# Patient Record
(demographics unavailable — no encounter records)

---

## 2025-03-14 NOTE — HISTORY OF PRESENT ILLNESS
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  During recent hospital admission, patient had CVA workup with carotid duplex which showed no evidence of hemodynamically significant lesions in either of the carotid arteries. Patient reports bilateral lower extremity joint pain, right worse than left.  Denies rest pain or claudication symptoms.  Denies tissue loss.  No history of deep vein thrombosis or pulmonary embolism.

## 2025-03-14 NOTE — REASON FOR VISIT
[Consultation] : a consultation visit [[facility] ] : provided by [employer] [facility]   [Time Spent: ____ minutes] : Total time spent using  services: [unfilled] minutes. The patient's primary language is not English thus required  services. [Interpreters_IDNumber] : [id number] [Interpreters_FullName] : [place] [TWNoteComboBox1] : Wallisian

## 2025-03-14 NOTE — ASSESSMENT
[FreeTextEntry1] : 74-year-old female with bilateral lower extremity pain and tenderness.  Duplex today shows no evidence of deep vein thrombosis or superficial phlebitis in either of the lower extremities.  Patient with bilateral lower extremity weakness.  No rest pain or tissue loss. Pedal pulses are intact bilaterally.  Patient to return to office for baseline PVR with exercise.  Patient with history of CVA x 2.  Recent carotid duplex negative for carotid stenosis.  Patient to continue aspirin 81 mg daily.  Follow-up in August for carotid duplex.

## 2025-03-14 NOTE — PHYSICAL EXAM
[Normal Breath Sounds] : Normal breath sounds [Normal Rate and Rhythm] : normal rate and rhythm [2+] : left 2+ [Ankle Swelling (On Exam)] : present [Ankle Swelling Bilaterally] : bilaterally  [Varicose Veins Of Lower Extremities] : bilaterally [Ankle Swelling On The Right] : mild [No Rash or Lesion] : No rash or lesion [Alert] : alert [Calm] : calm [] : not present [Skin Ulcer] : no ulcer [de-identified] : No acute distress noted [FreeTextEntry1] : Few telangiectasias scattered throughout the lower extremities [de-identified] : Bilateral calf tenderness. No palpable cords. [de-identified] : Intact

## 2025-04-18 NOTE — PHYSICAL EXAM
[Normal Breath Sounds] : Normal breath sounds [Normal Rate and Rhythm] : normal rate and rhythm [2+] : left 2+ [Ankle Swelling (On Exam)] : present [Ankle Swelling Bilaterally] : bilaterally  [Varicose Veins Of Lower Extremities] : bilaterally [Ankle Swelling On The Right] : mild [] : not present [No Rash or Lesion] : No rash or lesion [Skin Ulcer] : no ulcer [Alert] : alert [Calm] : calm [de-identified] : No acute distress noted [FreeTextEntry1] : Few telangiectasias scattered throughout the lower extremities [de-identified] : Bilateral calf tenderness. No palpable cords. [de-identified] : Intact

## 2025-04-18 NOTE — ASSESSMENT
[FreeTextEntry1] : 74-year-old female with bilateral lower extremity pain and tenderness.  Recent duplex shows no evidence of deep vein thrombosis or superficial phlebitis in either of the lower extremities.  Suspect venous insufficiency.  Recommend compression stockings and leg elevation.  Patient with bilateral lower extremity weakness.  No rest pain or tissue loss. Pedal pulses are intact bilaterally.  PVRs show adequate flow in the lower extremities bilaterally with no significant arterial disease.  Patient with history of CVA x 2.  Recent carotid duplex negative for carotid stenosis.  Patient to continue aspirin 81 mg daily.  Follow-up in August for carotid duplex and venous duplex of bilateral lower extremities..

## 2025-04-18 NOTE — HISTORY OF PRESENT ILLNESS
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  During recent hospital admission, patient had CVA workup with carotid duplex which showed no evidence of hemodynamically significant lesions in either of the carotid arteries. Patient reports bilateral lower extremity joint pain, right worse than left.  Denies rest pain or claudication symptoms.  Denies tissue loss.

## 2025-04-18 NOTE — REASON FOR VISIT
[Follow-Up: _____] : a [unfilled] follow-up visit [Family Member] : family member [Language [facility] ] : provided by [employer] [facility]   [Time Spent: ____ minutes] : Total time spent using  services: [unfilled] minutes. The patient's primary language is not English thus required  services. [Interpreters_IDNumber] : [id number] [Interpreters_FullName] : Day [TWNoteComboBox1] : Moldovan